# Patient Record
Sex: MALE | Race: AMERICAN INDIAN OR ALASKA NATIVE
[De-identification: names, ages, dates, MRNs, and addresses within clinical notes are randomized per-mention and may not be internally consistent; named-entity substitution may affect disease eponyms.]

---

## 2018-07-06 ENCOUNTER — HOSPITAL ENCOUNTER (EMERGENCY)
Dept: HOSPITAL 31 - C.ER | Age: 28
Discharge: HOME | End: 2018-07-06
Payer: MEDICAID

## 2018-07-06 VITALS
RESPIRATION RATE: 16 BRPM | TEMPERATURE: 98 F | SYSTOLIC BLOOD PRESSURE: 115 MMHG | HEART RATE: 75 BPM | OXYGEN SATURATION: 99 % | DIASTOLIC BLOOD PRESSURE: 70 MMHG

## 2018-07-06 VITALS — BODY MASS INDEX: 27.3 KG/M2

## 2018-07-06 DIAGNOSIS — R05: Primary | ICD-10-CM

## 2018-07-06 NOTE — C.PDOC
Time Seen by Provider: 07/06/18 09:40


Chief Complaint (Nursing): Cough, Cold, Congestion


History Per: Patient


Onset/Duration Of Symptoms: Days (about 1 month)


Current Symptoms Are (Timing): Still Present


Associated Symptoms: Sore Throat (throat discomfort), Nasal Congestion


Severity: Moderate


Additional History Per: Prior Records





Past Medical History


Reviewed: Historical Data, Nursing Documentation, Vital Signs


Vital Signs: 





 Last Vital Signs











Temp  98.2 F   07/06/18 09:32


 


Pulse  79   07/06/18 09:32


 


Resp  18   07/06/18 09:32


 


BP  115/81   07/06/18 09:32


 


Pulse Ox  97   07/06/18 09:32














- Medical History


PMH: No Chronic Diseases


Family History: States: Unknown Family Hx





- Social History


Hx Tobacco Use: Yes


Hx Alcohol Use: Yes


Hx Substance Use: Yes





Review Of Systems


Except As Marked, All Systems Reviewed And Found Negative.


Constitutional: Negative for: Fever, Weakness


ENT: Positive for: Nose Congestion


Cardiovascular: Negative for: Chest Pain


Respiratory: Positive for: Cough.  Negative for: Shortness of Breath, Hemoptysis


Gastrointestinal: Negative for: Vomiting, Abdominal Pain


Musculoskeletal: Negative for: Neck Pain


Skin: Negative for: Rash


Neurological: Negative for: Weakness, Numbness





Physical Exam





- Physical Exam


Appears: Non-toxic, No Acute Distress


Skin: Normal Color, Warm, Dry, No Rash


Head: Atraumatic, Normacephalic


Eye(s): bilateral: Normal Inspection, PERRL, EOMI


Oral Mucosa: Moist, No Drooling, No Trismus


Throat: Normal


Neck: Normal ROM, Supple


Lymphatic: No Adenopathy


Cardiovascular: Rhythm Regular


Respiratory: Normal Breath Sounds, No Accessory Muscle Use


Gastrointestinal/Abdominal: Soft, No Tenderness


Extremity: Normal ROM


Neurological/Psych: Oriented x3, Normal Motor, Normal Sensation





ED Course And Treatment


O2 Sat by Pulse Oximetry: 97


Pulse Ox Interpretation: Normal





- Radiology


CXR: Viewed By Me, Read By Radiologist


CXR Interpretation: Yes: No Acute Disease





Disposition


Counseled Patient/Family Regarding: Studies Performed, Diagnosis, Need For 

Followup, Rx Given, Smoking Cessation





- Disposition


Referrals: 


[facility] at [facility] [Outside]


Disposition: HOME/ ROUTINE


Disposition Time: 10:20


Condition: STABLE


Additional Instructions: 


Stop smoking. Follow up in the clinic for further evaluation and treatment. 

Return to the ER if you develop shortness of breath, worsening of symptoms or 

if you have any other concerns. 


Prescriptions: 


Cetirizine HCl [Zyrtec] 10 mg PO DAILY PRN #30 capsule


 PRN Reason: Allergy Symptoms


Instructions:  Cough, Adult (DC)





- Clinical Impression


Clinical Impression: 


 Cough

## 2018-07-10 ENCOUNTER — HOSPITAL ENCOUNTER (EMERGENCY)
Dept: HOSPITAL 31 - C.ER | Age: 28
Discharge: HOME | End: 2018-07-10
Payer: MEDICAID

## 2018-07-10 VITALS
OXYGEN SATURATION: 97 % | TEMPERATURE: 98.2 F | HEART RATE: 78 BPM | DIASTOLIC BLOOD PRESSURE: 79 MMHG | SYSTOLIC BLOOD PRESSURE: 132 MMHG | RESPIRATION RATE: 20 BRPM

## 2018-07-10 VITALS — BODY MASS INDEX: 27.3 KG/M2
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
## 2018-07-10 DIAGNOSIS — Z23: ICD-10-CM

## 2018-07-10 DIAGNOSIS — S61.211A: Primary | ICD-10-CM
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
## 2018-07-10 NOTE — C.PDOC
History Of Present Illness


27 y/o male patient presents to the ER with a cut on the left proximal index 

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 

weeds.  Last tetanus shot unknown. no numbness or tingling. Patient does not 

have any other complaints. 


Time Seen by Provider: 07/10/18 12:09


Chief Complaint (Nursing): Abnormal Skin Integrity


History Per: Patient


History/Exam Limitations: no limitations


Onset/Duration Of Symptoms: Hrs


Current Symptoms Are (Timing): Still Present


Location Of Injury: Left: Hand (left proximal index finger)





Past Medical History


Reviewed: Historical Data, Nursing Documentation, Vital Signs


Vital Signs: 


 Last Vital Signs











Temp  98.2 F   07/10/18 12:02


 


Pulse  78   07/10/18 12:02


 


Resp  20   07/10/18 12:02


 


BP  132/79   07/10/18 12:02


 


Pulse Ox  97   07/10/18 22:05











Family History: States: Unknown Family Hx





- Social History


Hx Tobacco Use: Yes


Hx Alcohol Use: Yes


Hx Substance Use: Yes





- Immunization History


Hx Tetanus Toxoid Vaccination: No





Review Of Systems


Musculoskeletal: Positive for: Hand Pain (let proximal index finger laceration)


Neurological: Negative for: Weakness, Numbness





Physical Exam





- Physical Exam


Appears: Non-toxic, No Acute Distress


Skin: Normal Color, Warm, Dry


Extremity: Other (Left proximal index finger laceration  2 cm curved on dorsal 

aspect, from of finger. )


Pulses: Left Radial: Normal, Right Radial: Normal


Neurological/Psych: Oriented x3, Normal Speech, Normal Motor, Normal Sensation





ED Course And Treatment


O2 Sat by Pulse Oximetry: 97 (RA)


Pulse Ox Interpretation: Normal





Laceration





- Laceration Repair


  ** Left second digit


Wound Length (In cm): 2


Description Of Wound: Irregular (curved)


Anesthesia: Lidocaine 1% (10 ml )


Wound Examination: Irrigated With Saline, No Tendon Injury With Wound 

Exploration


Wound Closure: Suture (5-0 ethilon)


Suture Technique And Material Used: Interrupted (#8)


Wound Complexity: Simple





Medical Decision Making


Medical Decision Making: 


Impression: finger laceration


Plan:


-- Laceration repaired using sutures.





Patient instructed on wound care and informed to follow up in 10 days for 

suture removal.





Disposition


Counseled Patient/Family Regarding: Diagnosis, Need For Followup, Rx Given





- Disposition


Referrals: 


[facility] at [facility] [Outside]


Disposition: HOME/ ROUTINE


Disposition Time: 13:44


Condition: GOOD


Additional Instructions: 


Keep clean and dry. Return to ER for any sign of infection. Suture removal 10 

days.  Apply bacitracin when changing dressing.  Tylenol or Motrin for pain. 


Prescriptions: 


Bacitracin OINT 1 applic TOP BID #1 tube


Instructions:  Laceration Repair With Stitches (DC)


Forms:  [facility] (English), General Discharge Instructions





- Clinical Impression


Clinical Impression: 


 Laceration of left index finger








- Scribe Statement


The provider has reviewed the documentation as recorded by the Scribe


Rich Do 





All medical record entries made by the Scribe were at my direction and 

personally dictated by me. I have reviewed the chart and agree that the record 

accurately reflects my personal performance of the history, physical exam, 

medical decision making, and the department course for this patient. I have 

also personally directed, reviewed, and agree with the discharge instructions 

and disposition.